# Patient Record
Sex: MALE | Race: ASIAN | NOT HISPANIC OR LATINO | Employment: STUDENT | URBAN - METROPOLITAN AREA
[De-identification: names, ages, dates, MRNs, and addresses within clinical notes are randomized per-mention and may not be internally consistent; named-entity substitution may affect disease eponyms.]

---
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
## 2022-01-12 ENCOUNTER — TELEPHONE (OUTPATIENT)
Dept: FAMILY MEDICINE | Facility: CLINIC | Age: 22
End: 2022-01-12

## 2022-01-12 ENCOUNTER — VIRTUAL VISIT (OUTPATIENT)
Dept: FAMILY MEDICINE | Facility: CLINIC | Age: 22
End: 2022-01-12

## 2022-01-12 DIAGNOSIS — Z98.890 HISTORY OF SURGERY: Primary | ICD-10-CM

## 2022-01-12 PROCEDURE — 99212 OFFICE O/P EST SF 10 MIN: CPT | Mod: 95 | Performed by: PEDIATRICS

## 2022-01-12 NOTE — PROGRESS NOTES
Julian is a 21 year old who is being evaluated via a billable video visit.      How would you like to obtain your AVS? Mail a copy  If the video visit is dropped, the invitation should be resent by: Text to cell phone: .  Will anyone else be joining your video visit? No      Video Start Time: 2:38 PM    Assessment & Plan     History of surgery  KENISHA emailed to  to allow him to  records.  Will attempt to obtain records from [facility].                     Return in about 4 weeks (around 2/9/2022) for Routine Visit.    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  Patient had a surgery as a baby in 2002 at [facility].  The patient does not know what the surgery was for and we have no record of it in our system.  He otherwise is healthy and has no medical concerns.  He needs information about his surgery and a letter of medical clearance in order to sign up for the army.      Patient wants his   to  the records:  Remigio Pyle, Staff t [employer].    [phone] or [phone].    [email or web address]          Review of Systems   Constitutional, HEENT, cardiovascular, pulmonary, gi and gu systems are negative, except as otherwise noted.      Objective           Vitals:  No vitals were obtained today due to virtual visit.    Physical Exam   GENERAL: Healthy, alert and no distress  EYES: Eyes grossly normal to inspection.  No discharge or erythema, or obvious scleral/conjunctival abnormalities.  RESP: No audible wheeze, cough, or visible cyanosis.  No visible retractions or increased work of breathing.    SKIN: Visible skin clear. No significant rash, abnormal pigmentation or lesions.  NEURO: Cranial nerves  grossly intact.  Mentation and speech appropriate for age.  PSYCH: Mentation appears normal, affect normal/bright, judgement and insight intact, normal speech and appearance well-groomed.                Video-Visit Details    Type of service:  Video Visit    Video End Time:2:45    Originating Location (pt. Location): Other army recruiting office    Distant Location (provider location):  [facility]     Platform used for Video Visit: [employer]

## 2022-01-12 NOTE — TELEPHONE ENCOUNTER
Please call [facility] to send records regarding a surgery done on this patient in 2002.    Electronically signed by:  Mary Stout MD

## 2022-01-12 NOTE — TELEPHONE ENCOUNTER
[facility] and they state pt will have to fill out a KENISHA for this. Their fax number is [phone]    Called pt. Mailed KENISHA to patient [address] [place] [address]. Pt will drop this off once it is complete.

## 2022-08-26 ENCOUNTER — TELEPHONE (OUTPATIENT)
Dept: FAMILY MEDICINE | Facility: CLINIC | Age: 22
End: 2022-08-26

## 2022-08-26 NOTE — TELEPHONE ENCOUNTER
General Call    Contacts       Type Contact Phone/Fax    08/26/2022 04:29 PM CDT Phone (Incoming) Julian West Ma (Self) [phone] (H)        Reason for Call:  Pt wanted to change appt to telephone call    What are your questions or concerns: concern about surgery when was a child if can go into the .  See notes from January 2022    Date of last appointment with provider: 7/30/2020    Call taken 8/26/22 at 355 pm.  By ALIYAH Treadwell

## 2022-09-09 ENCOUNTER — VIRTUAL VISIT (OUTPATIENT)
Dept: FAMILY MEDICINE | Facility: CLINIC | Age: 22
End: 2022-09-09
Payer: COMMERCIAL

## 2022-09-09 DIAGNOSIS — Z98.890 HISTORY OF SURGERY: Primary | ICD-10-CM

## 2022-09-09 PROCEDURE — 99213 OFFICE O/P EST LOW 20 MIN: CPT | Mod: TEL | Performed by: FAMILY MEDICINE

## 2022-09-09 NOTE — PROGRESS NOTES
Julian is a 22 year old who is being evaluated via a billable telephone visit.      What phone number would you like to be contacted at? [phone]  How would you like to obtain your AVS? Mail a copy    Assessment & Plan     History of surgery    Letter for  .                   Return in about 1 year (around 2023) for Routine preventive.    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  No problems following that.    He would like to join the , and needs a letter of explanation regarding the surgery.    Review of Systems   No fever or cough.  No bowel problems.      Objective           Vitals:  No vitals were obtained today due to virtual visit.    Physical Exam     PSYCH: Alert and oriented times 3; coherent speech, normal   rate and volume, able to articulate logical thoughts, able   to abstract reason, no tangential thoughts, no hallucinations   or delusions  His affect is normal  RESP: No cough, no audible wheezing, able to talk in full sentences  Remainder of exam unable to be completed due to telephone visits            Phone call duration: 7 minutes

## 2023-09-29 ENCOUNTER — HOSPITAL ENCOUNTER (EMERGENCY)
Facility: CLINIC | Age: 23
Discharge: HOME | End: 2023-09-29
Admitting: EMERGENCY MEDICINE
Payer: COMMERCIAL

## 2023-09-29 VITALS
RESPIRATION RATE: 16 BRPM | TEMPERATURE: 97.6 F | DIASTOLIC BLOOD PRESSURE: 92 MMHG | OXYGEN SATURATION: 98 % | SYSTOLIC BLOOD PRESSURE: 146 MMHG | HEART RATE: 95 BPM

## 2023-09-29 PROCEDURE — 99281 EMR DPT VST MAYX REQ PHY/QHP: CPT

## 2024-05-01 ENCOUNTER — TELEPHONE (OUTPATIENT)
Dept: FAMILY MEDICINE | Facility: CLINIC | Age: 24
End: 2024-05-01
Payer: COMMERCIAL

## 2024-05-01 NOTE — TELEPHONE ENCOUNTER
Patient Quality Outreach    Patient is due for the following:   Hypertension -  BP check  Physical Preventive Adult Physical      Topic Date Due    Diptheria Tetanus Pertussis (DTAP/TDAP/TD) Vaccine (6 - Td or Tdap) 10/29/2022    COVID-19 Vaccine (3 - 2023-24 season) 09/01/2023       Next Steps:   Schedule a Adult Preventative    Type of outreach:    Phone, spoke to patient/parent. Patient/parent will call back to schedule.    Next Steps:  Reach out within 90 days via Phone.    Max number of attempts reached: Yes. Will try again in 90 days if patient still on fail list.    Questions for provider review:    None           Roger Thompson MA  Chart routed to Care Team.